# Patient Record
Sex: MALE | Race: WHITE | NOT HISPANIC OR LATINO | URBAN - METROPOLITAN AREA
[De-identification: names, ages, dates, MRNs, and addresses within clinical notes are randomized per-mention and may not be internally consistent; named-entity substitution may affect disease eponyms.]

---

## 2017-06-21 ENCOUNTER — TRANSFERRED RECORDS (OUTPATIENT)
Dept: HEALTH INFORMATION MANAGEMENT | Facility: CLINIC | Age: 72
End: 2017-06-21

## 2017-06-28 ENCOUNTER — TRANSFERRED RECORDS (OUTPATIENT)
Dept: HEALTH INFORMATION MANAGEMENT | Facility: CLINIC | Age: 72
End: 2017-06-28

## 2017-07-20 ENCOUNTER — OFFICE VISIT (OUTPATIENT)
Dept: DERMATOLOGY | Facility: CLINIC | Age: 72
End: 2017-07-20

## 2017-07-20 DIAGNOSIS — R23.8 BULLA: Primary | ICD-10-CM

## 2017-07-20 RX ORDER — CALCIUM CARBONATE/VITAMIN D3 500-10/5ML
LIQUID (ML) ORAL
COMMUNITY

## 2017-07-20 RX ORDER — ATENOLOL 50 MG/1
50 TABLET ORAL
COMMUNITY

## 2017-07-20 RX ORDER — ROSUVASTATIN CALCIUM 10 MG/1
10 TABLET, COATED ORAL
COMMUNITY

## 2017-07-20 RX ORDER — GLIPIZIDE 5 MG/1
5 TABLET ORAL
COMMUNITY

## 2017-07-20 RX ORDER — FUROSEMIDE 20 MG
20 TABLET ORAL
COMMUNITY

## 2017-07-20 RX ORDER — ALBUTEROL SULFATE 90 UG/1
1-2 AEROSOL, METERED RESPIRATORY (INHALATION)
COMMUNITY

## 2017-07-20 RX ORDER — CLOPIDOGREL BISULFATE 75 MG/1
75 TABLET ORAL
COMMUNITY

## 2017-07-20 RX ORDER — LANOLIN ALCOHOL/MO/W.PET/CERES
1000 CREAM (GRAM) TOPICAL
COMMUNITY

## 2017-07-20 RX ORDER — NITROGLYCERIN 0.4 MG/1
0.4 TABLET SUBLINGUAL
COMMUNITY

## 2017-07-20 RX ORDER — INSULIN GLARGINE 100 [IU]/ML
100 INJECTION, SOLUTION SUBCUTANEOUS
COMMUNITY

## 2017-07-20 RX ORDER — LISINOPRIL 10 MG/1
10 TABLET ORAL
COMMUNITY

## 2017-07-20 RX ORDER — ISOSORBIDE MONONITRATE 30 MG/1
30 TABLET, EXTENDED RELEASE ORAL
COMMUNITY

## 2017-07-20 RX ORDER — DIPHENOXYLATE HYDROCHLORIDE AND ATROPINE SULFATE 2.5; .025 MG/1; MG/1
TABLET ORAL
COMMUNITY

## 2017-07-20 RX ORDER — TRIAMCINOLONE ACETONIDE 1 MG/G
CREAM TOPICAL
COMMUNITY
Start: 2017-06-28

## 2017-07-20 RX ORDER — ASPIRIN 81 MG/1
162 TABLET ORAL
COMMUNITY

## 2017-07-20 RX ORDER — GABAPENTIN 300 MG/1
300 CAPSULE ORAL
COMMUNITY

## 2017-07-20 RX ORDER — PENTOXIFYLLINE 400 MG/1
400 TABLET, EXTENDED RELEASE ORAL
COMMUNITY

## 2017-07-20 RX ORDER — FERROUS SULFATE 325(65) MG
325 TABLET ORAL
COMMUNITY

## 2017-07-20 RX ORDER — ACETAMINOPHEN 325 MG/1
325-650 TABLET ORAL
COMMUNITY

## 2017-07-20 RX ORDER — DIMENHYDRINATE 50 MG
TABLET ORAL
COMMUNITY

## 2017-07-20 RX ORDER — SUCRALFATE 1 G/1
1 TABLET ORAL
COMMUNITY

## 2017-07-20 RX ORDER — FLUTICASONE PROPIONATE 50 MCG
1 SPRAY, SUSPENSION (ML) NASAL
COMMUNITY

## 2017-07-20 ASSESSMENT — PAIN SCALES - GENERAL: PAINLEVEL: NO PAIN (0)

## 2017-07-20 NOTE — MR AVS SNAPSHOT
After Visit Summary   7/20/2017    Glenn Bhatt    MRN: [id number]           Patient Information     Date Of Birth          1945        Visit Information        Provider Department      7/20/2017 1:15 PM Sadi Cornell MD [facility] Dermatology        Today's Diagnoses     Bulla    -  1       Follow-ups after your visit        Your next 10 appointments already scheduled     Jul 20, 2017  3:15 PM CDT   LAB with  LAB   [facility] Lab ([facility])    [address] [place] [id number] [phone]           Patient must bring picture ID.  Patient should be prepared to give a urine specimen  Please do not eat 10-12 hours before your appointment if you are coming in fasting for labs on lipids, cholesterol, or glucose (sugar).  Pregnant women should follow their Care Team instructions. Water with medications is okay. Do not drink coffee or other fluids.   If you have concerns about taking  your medications, please ask at office or if scheduling via [employer], send a message by clicking on Secure Messaging, Message Your Care Team.              Who to contact     Please call your clinic at [phone] to:    Ask questions about your health    Make or cancel appointments    Discuss your medicines    Learn about your test results    Speak to your doctor   If you have compliments or concerns about an experience at your clinic, or if you wish to file a complaint, please contact [facility] Patient Relations at [phone] or email us at [email or web address]         Additional Information About Your Visit        [employer] Information     [employer] is an electronic gateway that provides easy, online access to your medical records. With [employer], you can request a clinic appointment, read your test results, renew a prescription or communicate with your care team.     To sign up for [employer] visit the website at  [email or web address]   You will be asked to enter the access code listed below, as well as some personal information. Please follow the directions to create your username and password.     Your access code is: [email or web address]  Expires: 2017 11:26 AM     Your access code will  in 90 days. If you need help or a new code, please contact your [facility] or call [phone] for assistance.        Care EveryWhere ID     This is your Care EveryWhere ID. This could be used by other organizations to access your [place] medical records  [id number]         Blood Pressure from Last 3 Encounters:   No data found for BP    Weight from Last 3 Encounters:   No data found for Wt              We Performed the Following     Bullous Pemphigoid Antigens        Primary Care Provider Office Phone #    Letty Noe [phone]       [address] [address]        Equal Access to Services     GIBRAN LEACH : Hadii kilo ku hadasho Soomaali, waaxda luqadaha, qaybta kaalmada adeegyada, waxay monicoin michaela lamb . So [facility] [phone].    ATENCIÓN: Si habla español, tiene a beck disposición servicios gratuitos de asistencia lingüística. Llame al [phone].    We comply with applicable federal civil rights laws and [place] laws. We do not discriminate on the basis of race, color, national origin, age, disability sex, sexual orientation or gender identity.            Thank you!     Thank you for choosing [facility] DERMATOLOGY  for your care. Our goal is always to provide you with excellent care. Hearing back from our patients is one way we can continue to improve our services. Please take a few minutes to complete the written survey that you may receive in the mail after your visit with us. Thank you!             Your Updated Medication List - Protect others around you: Learn how to safely use, store and throw away your medicines at  www.disposemymeds.org.          This list is accurate as of: 7/20/17  3:12 PM.  Always use your most recent med list.                   Brand Name Dispense Instructions for use Diagnosis    acetaminophen 325 MG tablet    TYLENOL     Take 325-650 mg by mouth        albuterol 108 (90 BASE) MCG/ACT Inhaler    PROAIR HFA/PROVENTIL HFA/VENTOLIN HFA     Inhale 1-2 puffs into the lungs        aspirin EC 81 MG EC tablet      Take 162 mg by mouth        atenolol 50 MG tablet    TENORMIN     Take 50 mg by mouth        clopidogrel 75 MG tablet    PLAVIX     Take 75 mg by mouth        coenzyme Q-10 100 MG Caps           Cranberry Extract 200 MG Caps      daily.        cyanocobalamin 1000 MCG tablet    vitamin  B-12     Take 1,000 mcg by mouth        evolocumab 140 MG/ML prefilled autoinjector    REPATHA     Inject 140 mg Subcutaneous        exenatide 10 MCG/0.04ML injection    BYETTA     Inject 10 mcg Subcutaneous        ferrous sulfate 325 (65 FE) MG tablet    IRON     Take 325 mg by mouth        fluticasone 50 MCG/ACT spray    FLONASE     1 spray        furosemide 20 MG tablet    LASIX     Take 20 mg by mouth        gabapentin 300 MG capsule    NEURONTIN     Take 300 mg by mouth        glipiZIDE 5 MG tablet    GLUCOTROL     Take 5 mg by mouth        insulin glargine 100 UNIT/ML injection    LANTUS     Inject 100 Units Subcutaneous        isosorbide mononitrate 30 MG 24 hr tablet    IMDUR     Take 30 mg by mouth        lisinopril 10 MG tablet    PRINIVIL/ZESTRIL     Take 10 mg by mouth        magnesium oxide 400 MG Caps           metFORMIN 1000 MG tablet    GLUCOPHAGE     Take 1,000 mg by mouth        MULTI-VITAMINS Tabs           nitroGLYcerin 0.4 MG sublingual tablet    NITROSTAT     Place 0.4 mg under the tongue        pentoxifylline 400 MG CR tablet    TRENtal     Take 400 mg by mouth        ranitidine 150 MG tablet    ZANTAC     Take 300 mg by mouth        rosuvastatin 10 MG tablet    CRESTOR     Take 10 mg by mouth         sucralfate 1 GM tablet    CARAFATE     Take 1 g by mouth        triamcinolone 0.1 % cream    KENALOG     Apply cream to left lower leg daily, avoid applying to open areas.

## 2017-07-20 NOTE — NURSING NOTE
Dermatology Rooming Note    Glenn Bhatt's goals for this visit include:   Chief Complaint   Patient presents with     Derm Problem     Glenn is here for his left leg from bipass surgery. It's doing very well- not wrapped.     Shana Enriquez CMA

## 2017-07-20 NOTE — LETTER
7/20/2017       RE: Glenn Bhatt  [address] [facility] [address]     Dear Colleague,    Thank you for referring your patient, Glenn Bhatt, to the [facility] DERMATOLOGY at [facility]. Please see a copy of my visit note below.    [facility] Dermatology Note    Dermatology Problem List:  1. Stasis Dermatitis - Hx of PAD s/p arterial bypass in left leg. Currently treated with Mepilex, triamcinolone 0.1% cream, Aquaphor.  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 Pemphigoid labs pending.    Encounter Date: Jul 20, 2017    CC:  Chief Complaint   Patient presents with     Derm Problem     Glenn is here for his left leg from bipass surgery. It's doing very well- not wrapped.     History of Present Illness:  Mr. Glenn Bhatt is a 72 year old man with well controlled diabetes mellitus and a history of CABG and PAD stenting who presents as a referral from his PCP in [place] for a chronic wound on his left leg and ankle. The patient states that in 2008 he had a stent placed in his left thigh just above the knee for PAD. The stent had failed and his leg became infected requiring multiple hospitalizations. In 2015 he had severe blistering and infection. In 2016 once the infection was under control he had skin grafts that included allo, auto, and xeno grafting. Since then he has had blistering and open wounds that have been very difficult to heal. Photos showed several non-inflamed bullae on the left lower leg. With the surgeries he had superficial veins removed making drainage difficult. He states that one doctor had him use Unna's boot and compression socks but that these always caused blistering immediately following removal. Finally they switched to a naturopathic remedy of using natural honey which seemed to greatly help. Now his doctor has been treating with zantac, Mepilex foam on the open  wound surrounded by triamcinolone 0.1% cream. In addition he is using Aquaphor for hydration. The wound currently has no open blisters but his PCP thought that there may be an underlying dermatological pathology. He has no other skin related concerns at this time.     Past Medical History:   Diabetes Mellitus - per patient last A1c <6%. Treated with glipizide, exenatide.  CABG/PAD - treated with clopidogrel, ASA 81mg, atenolol, crestor    History reviewed. No pertinent past medical history.  History reviewed. No pertinent surgical history.    Social History:  The patient is retired and worked in [employer] and [employer] for [employer]. The patient denies use of tanning beds and does not use sunscreen.    Family History:  No family history skin diseases.    Medications:  Current Outpatient Prescriptions   Medication Sig Dispense Refill     acetaminophen (TYLENOL) 325 MG tablet Take 325-650 mg by mouth       albuterol (PROAIR HFA/PROVENTIL HFA/VENTOLIN HFA) 108 (90 BASE) MCG/ACT Inhaler Inhale 1-2 puffs into the lungs       aspirin EC 81 MG EC tablet Take 162 mg by mouth       atenolol (TENORMIN) 50 MG tablet Take 50 mg by mouth       clopidogrel (PLAVIX) 75 MG tablet Take 75 mg by mouth       coenzyme Q-10 100 MG CAPS        Cranberry, Vacc oxycoccus, (CRANBERRY EXTRACT) 200 MG CAPS daily.       cyanocobalamin (VITAMIN  B-12) 1000 MCG tablet Take 1,000 mcg by mouth       evolocumab (REPATHA) 140 MG/ML prefilled autoinjector Inject 140 mg Subcutaneous       exenatide (BYETTA) 10 MCG/0.04ML injection Inject 10 mcg Subcutaneous       ferrous sulfate (IRON) 325 (65 FE) MG tablet Take 325 mg by mouth       fluticasone (FLONASE) 50 MCG/ACT spray 1 spray       furosemide (LASIX) 20 MG tablet Take 20 mg by mouth       gabapentin (NEURONTIN) 300 MG capsule Take 300 mg by mouth       glipiZIDE (GLUCOTROL) 5 MG tablet Take 5 mg by mouth       insulin glargine (LANTUS) 100 UNIT/ML injection Inject 100 Units  Subcutaneous       isosorbide mononitrate (IMDUR) 30 MG 24 hr tablet Take 30 mg by mouth       lisinopril (PRINIVIL/ZESTRIL) 10 MG tablet Take 10 mg by mouth       magnesium oxide 400 MG CAPS        metFORMIN (GLUCOPHAGE) 1000 MG tablet Take 1,000 mg by mouth       Multiple Vitamin (MULTI-VITAMINS) TABS        nitroGLYcerin (NITROSTAT) 0.4 MG sublingual tablet Place 0.4 mg under the tongue       pentoxifylline (TRENTAL) 400 MG CR tablet Take 400 mg by mouth       ranitidine (ZANTAC) 150 MG tablet Take 300 mg by mouth       rosuvastatin (CRESTOR) 10 MG tablet Take 10 mg by mouth       sucralfate (CARAFATE) 1 GM tablet Take 1 g by mouth       triamcinolone (KENALOG) 0.1 % cream Apply cream to left lower leg daily, avoid applying to open areas.       Allergies   Allergen Reactions     Diagnostic [employer] Hives     Doxycycline Hives     Codeine      Other reaction(s): Unknown     Diatrizoate Hives     Influenza A, H1n1      Other reaction(s): Unknown     Insulin Aspart      Other reaction(s): Other, see comments  Malaise     Iodine      Other reaction(s): Other, see comments  Povidine topical     Ioversol      Other reaction(s): Other - Describe In Comment Field  Comment:  , Description:      Sulfamethoxazole-Trimethoprim      Other reaction(s): Unknown     Liquid Adhesive Rash         Review of Systems:  -As per HPI  -Constitutional: The patient denies fatigue, fevers, chills, unintended weight loss, and night sweats.  -HEENT: Patient denies nonhealing oral sores.  -Skin: As above in HPI. No additional skin concerns.    Physical exam:  GEN: This is a well developed, well-nourished male in no acute distress, in a pleasant mood.    SKIN: Focused examination of the left lower leg and ankle was performed. He had a large red purple plaque with several areas of scale located over the left lower leg, ankle, and dorsal foot. There is a well healed incisional scar located over the medial left leg. No bullae or blisters  currently present.   -No other lesions of concern on areas examined.     Impression/Plan:  1. Stasis Dermatitis -  Red/purple plaque with scale on the lower leg along with 6/21/17 biopsy demonstrating dermal congestion and edema is consistent with a diagnosis of stasis dermatitis. We recommend that he continue with his current treatment of mepilex, triamcinolone 0.1% cream, and Aquaphor.  He thinks the ranitidine has been very helpful, but I am not sure the mechanism through which this would be helping. I am Ok with him continuing for now. If things are stable for several months, would try to come off this medication.    2. Bullae NOS    Biopsy from 6/14/17 demonstrated superficial and deep dermatitis with increased number of eosinophils. At this time we get labs looking for Bullous Pemphigoid Antigens. Photos brought in by the patient demonstrated a clear non-inflammatory bullae. We discussed compression socks and agree that at this time they should be avoided at this time due to the possibility of pressure induced lesions. Ddx: bullae diabeticorum vs BP. The picture looked most like bullae diabeticorum and those can occur due to pressure, but the biopsy demonstrated dermatitis with eos, which can be seen in bullous pemphigoid. Will check BP antibodies.    CC Dr. Bartolo Sauer on close of this encounter.  Follow-up prn for new or changing lesions.    Staff Involved:  Scribed by Thiago Martinez, MS4 for Dr. Sadi Cornell.    Provider Disclosure:   The documentation recorded by the scribe accurately reflects the services I personally performed and the decisions made by me.    Sadi Cornell MD, FAAD    Departments of Internal Medicine and Dermatology  [facility]  [phone]

## 2017-07-20 NOTE — PROGRESS NOTES
[facility] Dermatology Note    Dermatology Problem List:  1. Stasis Dermatitis - Hx of PAD s/p arterial bypass in left leg. Currently treated with Mepilex, triamcinolone 0.1% cream, Aquaphor.  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 Pemphigoid labs pending.    Encounter Date: Jul 20, 2017    CC:  Chief Complaint   Patient presents with     Derm Problem     Glenn is here for his left leg from bipass surgery. It's doing very well- not wrapped.     History of Present Illness:  Mr. Glenn Bhatt is a 72 year old man with well controlled diabetes mellitus and a history of CABG and PAD stenting who presents as a referral from his PCP in [place] for a chronic wound on his left leg and ankle. The patient states that in 2008 he had a stent placed in his left thigh just above the knee for PAD. The stent had failed and his leg became infected requiring multiple hospitalizations. In 2015 he had severe blistering and infection. In 2016 once the infection was under control he had skin grafts that included allo, auto, and xeno grafting. Since then he has had blistering and open wounds that have been very difficult to heal. Photos showed several non-inflamed bullae on the left lower leg. With the surgeries he had superficial veins removed making drainage difficult. He states that one doctor had him use Unna's boot and compression socks but that these always caused blistering immediately following removal. Finally they switched to a naturopathic remedy of using natural honey which seemed to greatly help. Now his doctor has been treating with zantac, Mepilex foam on the open wound surrounded by triamcinolone 0.1% cream. In addition he is using Aquaphor for hydration. The wound currently has no open blisters but his PCP thought that there may be an underlying dermatological pathology. He has no other skin related concerns at this time.     Past Medical  History:   Diabetes Mellitus - per patient last A1c <6%. Treated with glipizide, exenatide.  CABG/PAD - treated with clopidogrel, ASA 81mg, atenolol, crestor    History reviewed. No pertinent past medical history.  History reviewed. No pertinent surgical history.    Social History:  The patient is retired and worked in [employer] and [employer] for [employer]. The patient denies use of tanning beds and does not use sunscreen.    Family History:  No family history skin diseases.    Medications:  Current Outpatient Prescriptions   Medication Sig Dispense Refill     acetaminophen (TYLENOL) 325 MG tablet Take 325-650 mg by mouth       albuterol (PROAIR HFA/PROVENTIL HFA/VENTOLIN HFA) 108 (90 BASE) MCG/ACT Inhaler Inhale 1-2 puffs into the lungs       aspirin EC 81 MG EC tablet Take 162 mg by mouth       atenolol (TENORMIN) 50 MG tablet Take 50 mg by mouth       clopidogrel (PLAVIX) 75 MG tablet Take 75 mg by mouth       coenzyme Q-10 100 MG CAPS        Cranberry, Vacc oxycoccus, (CRANBERRY EXTRACT) 200 MG CAPS daily.       cyanocobalamin (VITAMIN  B-12) 1000 MCG tablet Take 1,000 mcg by mouth       evolocumab (REPATHA) 140 MG/ML prefilled autoinjector Inject 140 mg Subcutaneous       exenatide (BYETTA) 10 MCG/0.04ML injection Inject 10 mcg Subcutaneous       ferrous sulfate (IRON) 325 (65 FE) MG tablet Take 325 mg by mouth       fluticasone (FLONASE) 50 MCG/ACT spray 1 spray       furosemide (LASIX) 20 MG tablet Take 20 mg by mouth       gabapentin (NEURONTIN) 300 MG capsule Take 300 mg by mouth       glipiZIDE (GLUCOTROL) 5 MG tablet Take 5 mg by mouth       insulin glargine (LANTUS) 100 UNIT/ML injection Inject 100 Units Subcutaneous       isosorbide mononitrate (IMDUR) 30 MG 24 hr tablet Take 30 mg by mouth       lisinopril (PRINIVIL/ZESTRIL) 10 MG tablet Take 10 mg by mouth       magnesium oxide 400 MG CAPS        metFORMIN (GLUCOPHAGE) 1000 MG tablet Take 1,000 mg by mouth       Multiple Vitamin  (MULTI-VITAMINS) TABS        nitroGLYcerin (NITROSTAT) 0.4 MG sublingual tablet Place 0.4 mg under the tongue       pentoxifylline (TRENTAL) 400 MG CR tablet Take 400 mg by mouth       ranitidine (ZANTAC) 150 MG tablet Take 300 mg by mouth       rosuvastatin (CRESTOR) 10 MG tablet Take 10 mg by mouth       sucralfate (CARAFATE) 1 GM tablet Take 1 g by mouth       triamcinolone (KENALOG) 0.1 % cream Apply cream to left lower leg daily, avoid applying to open areas.       Allergies   Allergen Reactions     Diagnostic X-Ray Materials Hives     Doxycycline Hives     Codeine      Other reaction(s): Unknown     Diatrizoate Hives     Influenza A, H1n1      Other reaction(s): Unknown     Insulin Aspart      Other reaction(s): Other, see comments  Malaise     Iodine      Other reaction(s): Other, see comments  Povidine topical     Ioversol      Other reaction(s): Other - Describe In Comment Field  Comment:  , Description:      Sulfamethoxazole-Trimethoprim      Other reaction(s): Unknown     Liquid Adhesive Rash         Review of Systems:  -As per HPI  -Constitutional: The patient denies fatigue, fevers, chills, unintended weight loss, and night sweats.  -HEENT: Patient denies nonhealing oral sores.  -Skin: As above in HPI. No additional skin concerns.    Physical exam:  GEN: This is a well developed, well-nourished male in no acute distress, in a pleasant mood.    SKIN: Focused examination of the left lower leg and ankle was performed. He had a large red purple plaque with several areas of scale located over the left lower leg, ankle, and dorsal foot. There is a well healed incisional scar located over the medial left leg. No bullae or blisters currently present.   -No other lesions of concern on areas examined.     Impression/Plan:  1. Stasis Dermatitis -  Red/purple plaque with scale on the lower leg along with 6/21/17 biopsy demonstrating dermal congestion and edema is consistent with a diagnosis of stasis dermatitis. We  recommend that he continue with his current treatment of mepilex, triamcinolone 0.1% cream, and Aquaphor.  He thinks the ranitidine has been very helpful, but I am not sure the mechanism through which this would be helping. I am Ok with him continuing for now. If things are stable for several months, would try to come off this medication.    2. Bullae NOS    Biopsy from 6/14/17 demonstrated superficial and deep dermatitis with increased number of eosinophils. At this time we get labs looking for Bullous Pemphigoid Antigens. Photos brought in by the patient demonstrated a clear non-inflammatory bullae. We discussed compression socks and agree that at this time they should be avoided at this time due to the possibility of pressure induced lesions. Ddx: bullae diabeticorum vs BP. The picture looked most like bullae diabeticorum and those can occur due to pressure, but the biopsy demonstrated dermatitis with eos, which can be seen in bullous pemphigoid. Will check BP antibodies.    CC Dr. Bartolo Sauer on close of this encounter.  Follow-up prn for new or changing lesions.    Staff Involved:  Scribed by Thiago Martinez, MS4 for Dr. Sadi Cornell.    Provider Disclosure:   The documentation recorded by the scribe accurately reflects the services I personally performed and the decisions made by me.    Sadi Cornell MD, FAAD    Departments of Internal Medicine and Dermatology  [facility]  [phone]        Addendum: BP antibodies cam back positive. Pt likely has BP. Most recent med prior to this starting was lasix. Will have him hold lasix for now and f/u with PCP. He will call I 3 moths and let me know if he is getting new blisters. Would likely start minocycline first with more aggressive topicals, but patient is fairly well-controlled at this time on just mild topical and ranitidine alone.

## 2017-07-27 LAB
BMZ BP230 AB SERPL-ACNC: NORMAL
EER BULLOUS PEMPHIGOID ANTIGEN: NORMAL

## 2017-07-28 ENCOUNTER — TELEPHONE (OUTPATIENT)
Dept: DERMATOLOGY | Facility: CLINIC | Age: 72
End: 2017-07-28

## 2022-11-29 ENCOUNTER — LAB REQUISITION (OUTPATIENT)
Dept: LAB | Facility: CLINIC | Age: 77
End: 2022-11-29

## 2022-11-29 PROCEDURE — 82306 VITAMIN D 25 HYDROXY: CPT | Performed by: NURSE PRACTITIONER

## 2022-11-30 ENCOUNTER — LAB REQUISITION (OUTPATIENT)
Dept: LAB | Facility: CLINIC | Age: 77
End: 2022-11-30

## 2022-11-30 LAB — DEPRECATED CALCIDIOL+CALCIFEROL SERPL-MC: 42 UG/L (ref 20–75)

## 2022-11-30 PROCEDURE — 82652 VIT D 1 25-DIHYDROXY: CPT | Performed by: NURSE PRACTITIONER

## 2022-12-01 LAB — 1,25(OH)2D SERPL-MCNC: 25.3 PG/ML (ref 19.9–79.3)

## 2022-12-02 ENCOUNTER — LAB REQUISITION (OUTPATIENT)
Dept: LAB | Facility: CLINIC | Age: 77
End: 2022-12-02

## 2022-12-02 PROCEDURE — 87077 CULTURE AEROBIC IDENTIFY: CPT | Performed by: NURSE PRACTITIONER

## 2022-12-06 LAB
BACTERIA WND CULT: ABNORMAL